# Patient Record
Sex: FEMALE | Race: WHITE | NOT HISPANIC OR LATINO
[De-identification: names, ages, dates, MRNs, and addresses within clinical notes are randomized per-mention and may not be internally consistent; named-entity substitution may affect disease eponyms.]

---

## 2017-08-21 ENCOUNTER — FORM ENCOUNTER (OUTPATIENT)
Age: 57
End: 2017-08-21

## 2018-07-30 ENCOUNTER — FORM ENCOUNTER (OUTPATIENT)
Age: 58
End: 2018-07-30

## 2018-09-04 ENCOUNTER — FORM ENCOUNTER (OUTPATIENT)
Age: 58
End: 2018-09-04

## 2019-12-16 ENCOUNTER — FORM ENCOUNTER (OUTPATIENT)
Age: 59
End: 2019-12-16

## 2019-12-26 ENCOUNTER — FORM ENCOUNTER (OUTPATIENT)
Age: 59
End: 2019-12-26

## 2020-01-12 ENCOUNTER — FORM ENCOUNTER (OUTPATIENT)
Age: 60
End: 2020-01-12

## 2020-01-21 ENCOUNTER — FORM ENCOUNTER (OUTPATIENT)
Age: 60
End: 2020-01-21

## 2020-06-22 ENCOUNTER — FORM ENCOUNTER (OUTPATIENT)
Age: 60
End: 2020-06-22

## 2020-12-21 ENCOUNTER — FORM ENCOUNTER (OUTPATIENT)
Age: 60
End: 2020-12-21

## 2021-01-19 ENCOUNTER — APPOINTMENT (OUTPATIENT)
Dept: BREAST CENTER | Facility: CLINIC | Age: 61
End: 2021-01-19
Payer: COMMERCIAL

## 2021-01-19 VITALS
HEART RATE: 77 BPM | SYSTOLIC BLOOD PRESSURE: 150 MMHG | DIASTOLIC BLOOD PRESSURE: 87 MMHG | BODY MASS INDEX: 36.37 KG/M2 | HEIGHT: 68 IN | WEIGHT: 240 LBS

## 2021-01-19 DIAGNOSIS — Z87.891 PERSONAL HISTORY OF NICOTINE DEPENDENCE: ICD-10-CM

## 2021-01-19 DIAGNOSIS — Z78.9 OTHER SPECIFIED HEALTH STATUS: ICD-10-CM

## 2021-01-19 PROCEDURE — 99204 OFFICE O/P NEW MOD 45 MIN: CPT

## 2021-01-19 PROCEDURE — 99072 ADDL SUPL MATRL&STAF TM PHE: CPT

## 2021-01-19 RX ORDER — SITAGLIPTIN 100 MG/1
100 TABLET, FILM COATED ORAL
Qty: 90 | Refills: 0 | Status: ACTIVE | COMMUNITY
Start: 2019-11-03

## 2021-01-19 RX ORDER — COLCHICINE 0.6 MG/1
0.6 TABLET, FILM COATED ORAL
Qty: 30 | Refills: 0 | Status: ACTIVE | COMMUNITY
Start: 2020-07-30

## 2021-01-19 RX ORDER — DIPHENHYDRAMINE HCL 25 MG/1
25 CAPSULE ORAL ONCE
Qty: 1 | Refills: 0 | Status: ACTIVE | COMMUNITY
Start: 2021-01-19 | End: 1900-01-01

## 2021-01-19 RX ORDER — DIAZEPAM 2 MG/1
2 TABLET ORAL
Qty: 1 | Refills: 0 | Status: ACTIVE | COMMUNITY
Start: 2021-01-19 | End: 1900-01-01

## 2021-01-19 RX ORDER — METRONIDAZOLE 7.5 MG/G
0.75 GEL VAGINAL
Qty: 70 | Refills: 0 | Status: ACTIVE | COMMUNITY
Start: 2020-12-28

## 2021-01-19 RX ORDER — INSULIN GLARGINE 100 [IU]/ML
100 INJECTION, SOLUTION SUBCUTANEOUS
Qty: 45 | Refills: 0 | Status: ACTIVE | COMMUNITY
Start: 2019-12-15

## 2021-01-19 RX ORDER — NYSTATIN AND TRIAMCINOLONE ACETONIDE 100000; 1 [USP'U]/G; MG/G
100000-0.1 OINTMENT TOPICAL
Qty: 30 | Refills: 0 | Status: ACTIVE | COMMUNITY
Start: 2020-12-23

## 2021-01-19 RX ORDER — FLUCONAZOLE 150 MG/1
150 TABLET ORAL
Qty: 1 | Refills: 0 | Status: ACTIVE | COMMUNITY
Start: 2020-12-29

## 2021-01-19 RX ORDER — PREDNISONE 10 MG/1
10 TABLET ORAL ONCE
Qty: 1 | Refills: 0 | Status: ACTIVE | COMMUNITY
Start: 2021-01-19 | End: 1900-01-01

## 2021-01-19 RX ORDER — FLASH GLUCOSE SENSOR
KIT MISCELLANEOUS
Qty: 2 | Refills: 0 | Status: ACTIVE | COMMUNITY
Start: 2020-07-25

## 2021-01-20 NOTE — ASSESSMENT
[FreeTextEntry1] : 61yo female previously seen by Dr. Winter w/ hx of L Lumpx & ALND w/ Dr. Juliano Norris 1997 for IDC and DCIS ER/[place] (+), HER2 (-), (0/8) LN, negative margins s/p chemo + RT presents for surgical consultation for newly diagnosed L DCIS. Physical examination reveals a smaller L breast with post-radiation changes and lower inner quadrant skin thickening. Patient had genetic testing through Dr. oBoth's office and is to have MRI Monday 1/25/21. Patient to return in about one or two weeks to go over MRI results and discuss surgical recommendations. Prelude test to possibly be send on core to determine the potential need for RT.\par \par

## 2021-01-20 NOTE — PAST MEDICAL HISTORY
[Menarche Age ____] : age at menarche was [unfilled] [Menopause Age____] : age at menopause was [unfilled] [Total Preg ___] : G[unfilled] [FreeTextEntry5] : JEANNETTE [FreeTextEntry7] : YES

## 2021-01-20 NOTE — PHYSICAL EXAM
[Examined in the supine and seated position] : examined in the supine and seated position [No dominant masses] : no dominant masses in right breast  [No Nipple Retraction] : no right nipple retraction [No Nipple Discharge] : no right nipple discharge [Breast Mass Right Breast ___cm] : no masses [Breast Mass Left Breast ___cm] : no masses [No Axillary Lymphadenopathy] : no left axillary lymphadenopathy [de-identified] : smaller than R breast with post-radiation and post-lumpx changes, lower inner quadrant skin thickening noted

## 2021-01-20 NOTE — DATA REVIEWED
[FreeTextEntry1] : 3/[birth year]: L Lumpx & ALND: 3.2cm IDC and DCIS, (0/8) LN, ER+ (81%), ID+ (43%), HER2 (-), negative margins\par 12/17/19 Nicolas Screening MG - Increased skin thickening in lower inner L breast. BIRADS 0. Recommend MRI. \par 12/27/19 Nicolas MRI - Nodular enhancement at L inner portion of thickened skin surface. Recommend biopsy. BIRADS 4. \par 1/13/20 L 9:00 US Core Bx - fibrocollagenous scar w/ chronic inflammation. No evidence of neoplasm. Surgery consult necessary for clinical correlation and to evaluate L axillary lymph node with mildly thickened cortex that's normal in size. \par 1/22/20: L Punch Biopsy Lower Inner Quadrant: follicular cyst, infundibular type\par 12/22/20: Nicolas MG & US: scattered areas of fibroglandular density, L - new regional coarse calcs some w/ linear configuration periareolar region UOQ 3cm deep to nipple, benign axillary LN w/ fatty hilum, vascular calcs, post-op changes and calcifying oil cyst/fat necrosis UOQ, persistent skin thickening inner lower aspect stable compared to 1/2020, round rim and dystrophic calcs, US - L - stable skin thickening 9:00 2FN, 1.3cm abnormal LN slightly increased in size. BIRADS 4. L Stereo bx rec. \par 1/14/21: L Stereo bx and L Axillary Bx: L Stereo bx Upper Outer Periareolar - "barbell" marker - DCIS ER+ (95%), ID+ (80%) w/ residual adjacent calcs; L Axillary LN - reactive LN w/ sinus histiocytosis no evidence of metastatic carcinoma - concordant.

## 2021-01-28 ENCOUNTER — APPOINTMENT (OUTPATIENT)
Dept: BREAST CENTER | Facility: CLINIC | Age: 61
End: 2021-01-28
Payer: COMMERCIAL

## 2021-01-28 PROCEDURE — 99213 OFFICE O/P EST LOW 20 MIN: CPT

## 2021-01-28 PROCEDURE — 99072 ADDL SUPL MATRL&STAF TM PHE: CPT

## 2021-02-03 ENCOUNTER — NON-APPOINTMENT (OUTPATIENT)
Age: 61
End: 2021-02-03

## 2021-02-04 ENCOUNTER — NON-APPOINTMENT (OUTPATIENT)
Age: 61
End: 2021-02-04

## 2021-02-04 NOTE — PHYSICAL EXAM
[Examined in the supine and seated position] : examined in the supine and seated position [No dominant masses] : no dominant masses in right breast  [No Nipple Retraction] : no right nipple retraction [No Nipple Discharge] : no right nipple discharge [Breast Mass Right Breast ___cm] : no masses [Breast Mass Left Breast ___cm] : no masses [No Axillary Lymphadenopathy] : no left axillary lymphadenopathy [de-identified] : smaller than R breast with post-radiation and post-lumpx changes, lower inner quadrant skin thickening noted

## 2021-02-04 NOTE — ASSESSMENT
[FreeTextEntry1] : 61yo female previously seen by Dr. Winter w/ hx of L Lumpx & ALND w/ Dr. Juliano Norris 1997 for IDC and DCIS ER/[place] (+), HER2 (-), (0/8) LN, negative margins s/p chemo + RT presents for surgical consultation for newly diagnosed L DCIS. Physical examination reveals a smaller L breast with post-radiation changes and lower inner quadrant skin thickening. Patient had genetic testing through Dr. Booth's office which is still pending. MRI 1/25/21 shows no additional areas of concern. Prelude test still pending. Patient wants to move forward with surgical planning and prefers lumpectomy over mastectomy. \par \par

## 2021-02-04 NOTE — HISTORY OF PRESENT ILLNESS
[FreeTextEntry1] : 61yo female w/ newly diagnosed L DCIS w/ hx of L Lumpx & ALND w/ Dr. Juliano Norris 1997 for IDC and DCIS ER/[place] (+), HER2 (-), (0/8) LN, negative margins s/p chemo + RT. Patient had MRI 1/25/21 and no additional areas of concern were identified. Patient's genetic testing and Prelude test are still pending. Patient presents today to discuss moving forward with surgery and is interested in having a lumpectomy rather than a mastectomy. \par

## 2021-02-04 NOTE — DATA REVIEWED
[FreeTextEntry1] : [birth year]: L Lumpx & ALND: 3.2cm IDC and DCIS, (0/8) LN, ER+ (81%), WI+ (43%), HER2 (-), negative margins\par 12/17/19 Nicolas Screening MG - Increased skin thickening in lower inner L breast. BIRADS 0. Recommend MRI. \par 12/27/19 Nicolas MRI - Nodular enhancement at L inner portion of thickened skin surface. Recommend biopsy. BIRADS 4. \par 1/13/20 L 9:00 US Core Bx - fibrocollagenous scar w/ chronic inflammation. No evidence of neoplasm. Surgery consult necessary for clinical correlation and to evaluate L axillary lymph node with mildly thickened cortex that's normal in size. \par 1/22/20: L Punch Biopsy Lower Inner Quadrant: follicular cyst, infundibular type\par 12/22/20: Nicolas MG & US: scattered areas of fibroglandular density, L - new regional coarse calcs some w/ linear configuration periareolar region UOQ 3cm deep to nipple, benign axillary LN w/ fatty hilum, vascular calcs, post-op changes and calcifying oil cyst/fat necrosis UOQ, persistent skin thickening inner lower aspect stable compared to 1/2020, round rim and dystrophic calcs, US - L - stable skin thickening 9:00 2FN, 1.3cm abnormal LN slightly increased in size. BIRADS 4. L Stereo bx rec. \par 1/14/21: L Stereo bx and L Axillary Bx: L Stereo bx Upper Outer Periareolar - "barbell" marker - DCIS ER+ (95%), WI+ (80%) w/ residual adjacent calcs; L Axillary LN - reactive LN w/ sinus histiocytosis no evidence of metastatic carcinoma - concordant. \par \par 1/25/21: MRI: scattered fibroglandular tissue, minimal background parenchymal enhancement. L - periareolar region UOQ 1:00 and metallic marker is present w/ assoc. 0.7cm partial rim enhancement and adjacent associated hermatoma 3.2cm deep to nipple, 1.5cm from lateral skin surface at site of known DCIS, 0.8cm axillary LN assoc. w/ tissue marker w/ adjacent patchy enhancement c/w post-bx change, post-op changes UOQ, inner aspect of L breast, skin thickening and skin enhancement present., findings c/w known DCIS and assoc. bx change 1:00 periareolar region. BIRADS 6.

## 2021-02-17 ENCOUNTER — RESULT REVIEW (OUTPATIENT)
Age: 61
End: 2021-02-17

## 2021-02-17 ENCOUNTER — OUTPATIENT (OUTPATIENT)
Dept: OUTPATIENT SERVICES | Facility: HOSPITAL | Age: 61
LOS: 1 days | End: 2021-02-17
Payer: COMMERCIAL

## 2021-02-17 DIAGNOSIS — D05.12 INTRADUCTAL CARCINOMA IN SITU OF LEFT BREAST: ICD-10-CM

## 2021-02-17 PROCEDURE — 88321 CONSLTJ&REPRT SLD PREP ELSWR: CPT

## 2021-02-18 LAB — SURGICAL PATHOLOGY STUDY: SIGNIFICANT CHANGE UP

## 2021-02-23 ENCOUNTER — LABORATORY RESULT (OUTPATIENT)
Age: 61
End: 2021-02-23

## 2021-02-23 ENCOUNTER — OUTPATIENT (OUTPATIENT)
Dept: OUTPATIENT SERVICES | Facility: HOSPITAL | Age: 61
LOS: 1 days | End: 2021-02-23
Payer: COMMERCIAL

## 2021-02-23 ENCOUNTER — RESULT REVIEW (OUTPATIENT)
Age: 61
End: 2021-02-23

## 2021-02-23 ENCOUNTER — APPOINTMENT (OUTPATIENT)
Dept: MAMMOGRAPHY | Facility: HOSPITAL | Age: 61
End: 2021-02-23
Payer: COMMERCIAL

## 2021-02-23 PROCEDURE — 19281 PERQ DEVICE BREAST 1ST IMAG: CPT | Mod: LT

## 2021-02-23 PROCEDURE — C1739: CPT

## 2021-02-23 PROCEDURE — 19281 PERQ DEVICE BREAST 1ST IMAG: CPT

## 2021-02-25 ENCOUNTER — TRANSCRIPTION ENCOUNTER (OUTPATIENT)
Age: 61
End: 2021-02-25

## 2021-02-26 ENCOUNTER — OUTPATIENT (OUTPATIENT)
Dept: OUTPATIENT SERVICES | Facility: HOSPITAL | Age: 61
LOS: 1 days | Discharge: HOME | End: 2021-02-26
Payer: COMMERCIAL

## 2021-02-26 ENCOUNTER — APPOINTMENT (OUTPATIENT)
Dept: BREAST CENTER | Facility: CLINIC | Age: 61
End: 2021-02-26

## 2021-02-26 ENCOUNTER — RESULT REVIEW (OUTPATIENT)
Age: 61
End: 2021-02-26

## 2021-02-26 LAB — GLUCOSE BLDC GLUCOMTR-MCNC: 217 MG/DL — HIGH (ref 70–99)

## 2021-02-26 PROCEDURE — 88360 TUMOR IMMUNOHISTOCHEM/MANUAL: CPT | Mod: 26

## 2021-02-26 PROCEDURE — 88307 TISSUE EXAM BY PATHOLOGIST: CPT | Mod: 26

## 2021-02-26 PROCEDURE — 76098 X-RAY EXAM SURGICAL SPECIMEN: CPT | Mod: 26

## 2021-02-26 PROCEDURE — 88342 IMHCHEM/IMCYTCHM 1ST ANTB: CPT | Mod: 26,59

## 2021-02-26 PROCEDURE — 88305 TISSUE EXAM BY PATHOLOGIST: CPT | Mod: 26

## 2021-02-26 PROCEDURE — 19301 PARTIAL MASTECTOMY: CPT | Mod: LT

## 2021-02-26 PROCEDURE — 14301 TIS TRNFR ANY 30.1-60 SQ CM: CPT

## 2021-02-26 PROCEDURE — 88341 IMHCHEM/IMCYTCHM EA ADD ANTB: CPT | Mod: 26,59

## 2021-03-03 LAB — SURGICAL PATHOLOGY STUDY: SIGNIFICANT CHANGE UP

## 2021-03-08 ENCOUNTER — NON-APPOINTMENT (OUTPATIENT)
Age: 61
End: 2021-03-08

## 2021-03-11 ENCOUNTER — APPOINTMENT (OUTPATIENT)
Dept: BREAST CENTER | Facility: CLINIC | Age: 61
End: 2021-03-11
Payer: COMMERCIAL

## 2021-03-11 VITALS
HEIGHT: 68 IN | SYSTOLIC BLOOD PRESSURE: 145 MMHG | WEIGHT: 240 LBS | BODY MASS INDEX: 36.37 KG/M2 | DIASTOLIC BLOOD PRESSURE: 86 MMHG | HEART RATE: 81 BPM

## 2021-03-11 PROCEDURE — 99024 POSTOP FOLLOW-UP VISIT: CPT

## 2021-03-11 NOTE — CONSULT LETTER
[Dear  ___] : Dear  [unfilled], [Referral Letter:] : I am referring [unfilled] to you for further evaluation.  My most recent evaluation follows. [Referral Closing:] : Thank you very much for seeing this patient.  If you have any questions, please do not hesitate to contact me. [Sincerely,] : Sincerely,

## 2021-03-17 ENCOUNTER — NON-APPOINTMENT (OUTPATIENT)
Age: 61
End: 2021-03-17

## 2021-03-23 NOTE — HISTORY OF PRESENT ILLNESS
[FreeTextEntry1] : 59yo female presents for post-operative assessment s/p L Lumpx 2/26/21 which yielded 0.7cm DCIS, intermediate nuclear grade, solid and cribriform types, assoc. w/ calcs with one minute 0.15cm focus of DCIS present in additional lateral margin and 0.5cm away from final lateral margin. \par \par Patient has hx of L Lumpx & ALND w/ Dr. Juliano Norris 1997 for IDC and DCIS ER/[place] (+), HER2 (-), (0/8) LN, negative margins s/p chemo + RT. Patient had MRI 1/25/21 and no additional areas of concern were identified. Patient's genetic testing was negative and Prelude test revealed no additional benefit from radiation therapy.

## 2021-03-23 NOTE — DATA REVIEWED
[FreeTextEntry1] : [birth year]: L Lumpx & ALND: 3.2cm IDC and DCIS, (0/8) LN, ER+ (81%), [place]+ (43%), HER2 (-), negative margins\par 12/17/19 Nicolas Screening MG - Increased skin thickening in lower inner L breast. BIRADS 0. Recommend MRI. \par 12/27/19 Nicolas MRI - Nodular enhancement at L inner portion of thickened skin surface. Recommend biopsy. BIRADS 4. \par 1/13/20 L 9:00 US Core Bx - fibrocollagenous scar w/ chronic inflammation. No evidence of neoplasm. Surgery consult necessary for clinical correlation and to evaluate L axillary lymph node with mildly thickened cortex that's normal in size. \par 1/22/20: L Punch Biopsy Lower Inner Quadrant: follicular cyst, infundibular type\par 12/22/20: Nicolas MG & US: scattered areas of fibroglandular density, L - new regional coarse calcs some w/ linear configuration periareolar region UOQ 3cm deep to nipple, benign axillary LN w/ fatty hilum, vascular calcs, post-op changes and calcifying oil cyst/fat necrosis UOQ, persistent skin thickening inner lower aspect stable compared to 1/2020, round rim and dystrophic calcs, US - L - stable skin thickening 9:00 2FN, 1.3cm abnormal LN slightly increased in size. BIRADS 4. L Stereo bx rec. \par 1/14/21: L Stereo bx and L Axillary Bx: L Stereo bx Upper Outer Periareolar - "barbell" marker - DCIS ER+ (95%), [place]+ (80%) w/ residual adjacent calcs; L Axillary LN - reactive LN w/ sinus histiocytosis no evidence of metastatic carcinoma - concordant. \par \par 1/25/21: MRI: scattered fibroglandular tissue, minimal background parenchymal enhancement. L - periareolar region UOQ 1:00 and metallic marker is present w/ assoc. 0.7cm partial rim enhancement and adjacent associated hermatoma 3.2cm deep to nipple, 1.5cm from lateral skin surface at site of known DCIS, 0.8cm axillary LN assoc. w/ tissue marker w/ adjacent patchy enhancement c/w post-bx change, post-op changes UOQ, inner aspect of L breast, skin thickening and skin enhancement present., findings c/w known DCIS and assoc. bx change 1:00 periareolar region. BIRADS 6.\par \par 2/26/21: L Lumpx: DCIS, intermeidate nuclear grade, solid and cribriform types, assoc. w/ calcs with one minute 0.15cm focus of DCIS in additional lateral margin and 0.5cm away from final lateral margin

## 2021-03-23 NOTE — ASSESSMENT
[FreeTextEntry1] : 59yo female presents for post-operative assessment s/p L Lumpx 2/26/21 which yielded 0.7cm DCIS, intermediate nuclear grade, solid and cribriform types, assoc. w/ calcs with one minute 0.15cm focus of DCIS present in additional lateral margin and 0.5cm away from final lateral margin. \par \par Patient has hx of L Lumpx & ALND w/ Dr. Juliano Norris 1997 for IDC and DCIS ER/[place] (+), HER2 (-), (0/8) LN, negative margins s/p chemo + RT. Patient had MRI 1/25/21 and no additional areas of concern were identified. Patient's genetic testing was negative and Prelude test revealed no additional benefit from radiation therapy. \par \par Physical examination reveals well-healing L Lumpectomy incision with no erythema, dehiscence, or drainage noted. Old steri strips removed and new steris were applied. Patient is to have Nicolas DXMG & US and re-examination in 9 months. She is to have radiation oncology consultation with Dr. Patel and medical oncology consultation with Dr. Kayla Erwin.

## 2021-03-23 NOTE — PHYSICAL EXAM
[Examined in the supine and seated position] : examined in the supine and seated position [No dominant masses] : no dominant masses in right breast  [No Nipple Retraction] : no right nipple retraction [No Nipple Discharge] : no right nipple discharge [Breast Mass Right Breast ___cm] : no masses [Breast Mass Left Breast ___cm] : no masses [No Axillary Lymphadenopathy] : no left axillary lymphadenopathy [de-identified] : smaller than R breast with post-radiation and post-lumpx changes, lower inner quadrant skin thickening noted\par \par well-healing lumpectomy site with no erythema, dehiscence, or drainage

## 2021-04-08 ENCOUNTER — APPOINTMENT (OUTPATIENT)
Dept: RADIATION ONCOLOGY | Facility: CLINIC | Age: 61
End: 2021-04-08
Payer: COMMERCIAL

## 2021-04-08 VITALS
RESPIRATION RATE: 17 BRPM | DIASTOLIC BLOOD PRESSURE: 89 MMHG | BODY MASS INDEX: 36.34 KG/M2 | TEMPERATURE: 98.3 F | SYSTOLIC BLOOD PRESSURE: 144 MMHG | OXYGEN SATURATION: 99 % | WEIGHT: 239 LBS | HEART RATE: 77 BPM

## 2021-04-08 DIAGNOSIS — E03.9 HYPOTHYROIDISM, UNSPECIFIED: ICD-10-CM

## 2021-04-08 PROCEDURE — 99072 ADDL SUPL MATRL&STAF TM PHE: CPT

## 2021-04-08 PROCEDURE — 99204 OFFICE O/P NEW MOD 45 MIN: CPT | Mod: 25

## 2021-04-08 RX ORDER — GLIPIZIDE 10 MG/1
10 TABLET ORAL
Refills: 0 | Status: ACTIVE | COMMUNITY

## 2021-04-08 RX ORDER — LEVOTHYROXINE SODIUM 137 UG/1
TABLET ORAL
Refills: 0 | Status: ACTIVE | COMMUNITY

## 2021-04-08 RX ORDER — SULFAMETHOXAZOLE AND TRIMETHOPRIM 800; 160 MG/1; MG/1
800-160 TABLET ORAL
Qty: 6 | Refills: 0 | Status: COMPLETED | COMMUNITY
Start: 2020-12-29 | End: 2021-04-08

## 2021-04-08 RX ORDER — ATORVASTATIN CALCIUM 80 MG/1
TABLET, FILM COATED ORAL
Refills: 0 | Status: ACTIVE | COMMUNITY

## 2021-04-08 RX ORDER — OXYCODONE AND ACETAMINOPHEN 5; 325 MG/1; MG/1
5-325 TABLET ORAL
Qty: 10 | Refills: 0 | Status: DISCONTINUED | COMMUNITY
Start: 2021-02-26 | End: 2021-04-08

## 2021-04-08 RX ORDER — METFORMIN HYDROCHLORIDE 625 MG/1
TABLET ORAL
Refills: 0 | Status: ACTIVE | COMMUNITY

## 2021-04-08 RX ORDER — METHYLPREDNISOLONE 32 MG/1
32 TABLET ORAL TWICE DAILY
Qty: 2 | Refills: 0 | Status: DISCONTINUED | COMMUNITY
Start: 2021-01-22 | End: 2021-04-08

## 2021-04-08 RX ORDER — RAMIPRIL 5 MG/1
CAPSULE ORAL
Refills: 0 | Status: ACTIVE | COMMUNITY

## 2021-04-08 NOTE — PHYSICAL EXAM
[Obese] : obese [] : no respiratory distress [Respiration, Rhythm And Depth] : normal respiratory rhythm and effort [Auscultation Breath Sounds / Voice Sounds] : lungs were clear to auscultation bilaterally [Heart Rate And Rhythm] : heart rate and rhythm were normal [Heart Sounds] : normal S1 and S2 [Normal] : oriented to person, place and time, the affect was normal, the mood was normal and not anxious [de-identified] : declined

## 2021-04-08 NOTE — HISTORY OF PRESENT ILLNESS
[FreeTextEntry1] : Ms. Sutton is a 60 year old female referred by Dr. Smith for consideration of radiation therapy for an intermediate nuclear grade DCIS of the LEFT breast, ER positive, NJ positive, [id number]. AJCC stage 0.\par \par Patient has history of left breast IDC with DCIS, ER/NJ positive, HER-2 negative 0/8 lymph nodes, s/p left lumpectomy and ALND in 1997. followed by chemotherapy and adjuvant whole breast radiation therapy.\par \par Imaging as follows as per Dr. Smith's note:\par 12/17/19- Bilat screening mammo- Increased skin thickening in lower inner LEFT breast. BIRADS 0. Recommend MRI. \par 12/27/19- Breast MRI- Nodular enhancement at LEFT inner portion of thickened skin surface. Recommend biopsy. BI-RADS 4. \par 1/13/2020- LEFT 9:00 US Core Bx- fibrocollagenous scar w/ chronic inflammation. No evidence of neoplasm. Surgery consult necessary for clinical correlation and to evaluate LEFT axillary lymph node with mildly thickened cortex that's normal in size. \par 1/22/2020- LEFT Punch Biopsy Lower Inner Quadrant: follicular cyst, infundibular type\par 12/22/2020- Bilat mammo/sono- scattered areas of fibroglandular density, LEFT- new regional coarse calcs some w/ linear configuration periareolar region UOQ 3cm deep to nipple, benign axillary LN w/ fatty hilum, vascular calcs, post-op changes and calcifying oil cyst/fat necrosis UOQ, persistent skin thickening inner lower aspect stable compared to 1/2020, round rim and dystrophic calcs, US - L - stable skin thickening 9:00 2FN, 1.3cm abnormal LN slightly increased in size. BIRADS 4. L Stereo bx rec. \par \par Stereotactic biopsy of the mass in the left breast upper outer quadrant done 1/11/21 showed the following pathology:\par -Ductal carcinoma in situ (DCIS), cribriform type with intermediate to high nuclear grade with marked necrosis.  Calcifications associated with DCIS. ER positive, (95%, strong) and NJ positive (80%, strong).\par \par US guided core biopsy of left axillary lymph node done 1/11/21 showed no evidence of metastatic neoplasm. \par \par Breast MRI done 1/25/21 showed the following:\par -In the anterior LEFT breast in the periareolar region at 1:00, there are findings consistent with the known DCIS and associated postbiopsy change. \par -Skin thickening and skin enhancement at the inner aspect of the left breast is stable. \par -No suspicious abnormality on the right. \par BI-RADS 6\par \par Lumpectomy done 2/26/21 showed the following pathology: \par 1. Left breast mass, resection: Ductal carcinoma in situ (DCIS), intermediate nuclear grade, solid and cribriform types, associated with calcifications. \par 2. Left breast medial margin, resection: Benign.\par 3. Left breast inferior margin, resection: Benign. \par 4. Left breast lateral margin, resection: One minute focus of ductal carcinoma in situ (DCIS) involving one single duct (1.5 mm), 5 mm from the final lateral margin. \par 5. Left breast superior margin, resection: Benign. \par 6. Left breast deep margin, resection: Benign. \par 7. Left breast anterior margin, resection: Benign. \par 8. Left breast superior-medial, resection: Benign. \par Pathologic Staging (pTMN, AJCC 8th Edition): pTis pNx\par \par As per Dr. Smith's note, genetic testing was negative and DCISionRT revealed no additional benefit from RT (score was 0.8). She followed up with Dr. Smith on 3/11/21, at which time she was referred here for discussion of RT. She was also referred to Medical Oncologist Dr. Erwin, who she is scheduled to see on 4/12/21. She is to follow up with Dr. Smith in December. \par \par Today, she notes she feels fairly well. Notes slight tenderness to left breast. Denies any further complaints to include fever, chills, fatigue, CP, SOB. She notes RT to left breast in the past resulted in breast shrinkage and skin thickening. \par \par Family history notable for the following:\par Mother- breast cancer diagnosed in her 70's and lung cancer diagnosed in her 80's\par Maternal aunt- breast cancer diagnosed in her 70's and lung cancer diagnosed in her 90's\par Maternal cousins- three maternal first cousins with breast cancer \par \par Patient is a former smoker. She lives in [facility]) with her . She gets to medical appointments via car.

## 2021-04-08 NOTE — REVIEW OF SYSTEMS
[Negative] : Allergic/Immunologic [de-identified] : s/p RT to left breast, s/p lumpectomy left breast

## 2021-04-08 NOTE — VITALS
[Maximal Pain Intensity: 0/10] : 0/10 [Least Pain Intensity: 0/10] : 0/10 [90: Able to carry normal activity; minor signs or symptoms of disease.] : 90: Able to carry normal activity; minor signs or symptoms of disease.  [Date: ____________] : Patient's last distress assessment performed on [unfilled]. [1 - Distress Level] : Distress Level: 1

## 2021-04-12 ENCOUNTER — RESULT REVIEW (OUTPATIENT)
Age: 61
End: 2021-04-12

## 2021-04-12 ENCOUNTER — APPOINTMENT (OUTPATIENT)
Dept: HEMATOLOGY ONCOLOGY | Facility: CLINIC | Age: 61
End: 2021-04-12
Payer: COMMERCIAL

## 2021-04-12 VITALS
OXYGEN SATURATION: 99 % | BODY MASS INDEX: 37.51 KG/M2 | DIASTOLIC BLOOD PRESSURE: 83 MMHG | SYSTOLIC BLOOD PRESSURE: 147 MMHG | TEMPERATURE: 98.6 F | WEIGHT: 247.5 LBS | RESPIRATION RATE: 16 BRPM | HEART RATE: 70 BPM | HEIGHT: 67.99 IN

## 2021-04-12 DIAGNOSIS — E11.9 TYPE 2 DIABETES MELLITUS W/OUT COMPLICATIONS: ICD-10-CM

## 2021-04-12 DIAGNOSIS — E66.9 OBESITY, UNSPECIFIED: ICD-10-CM

## 2021-04-12 DIAGNOSIS — I10 ESSENTIAL (PRIMARY) HYPERTENSION: ICD-10-CM

## 2021-04-12 PROCEDURE — 99072 ADDL SUPL MATRL&STAF TM PHE: CPT

## 2021-04-12 PROCEDURE — 99245 OFF/OP CONSLTJ NEW/EST HI 55: CPT

## 2021-04-13 PROBLEM — I10 HYPERTENSION, UNSPECIFIED TYPE: Status: ACTIVE | Noted: 2021-04-08

## 2021-04-13 PROBLEM — E66.9 OBESITY, CLASS II, BMI 35-39.9: Status: ACTIVE | Noted: 2021-04-13

## 2021-04-13 PROBLEM — E11.9 DIABETES MELLITUS: Status: ACTIVE | Noted: 2021-01-19

## 2021-04-13 NOTE — ASSESSMENT
[FreeTextEntry1] : 59 yo  female, G0,  with second occurrence of right breast malignancy.\par \par March 1997 : L Lumpx & ALND: 3.2cm IDC and DCIS, (0/8) LN, ER+ (81%), WA+ (43%), HER2 (-), negative margins, s/p CMF, radiation, No endocrine treatment offered to patient.\par \par Feb 2021- left breast lumpectomy- DCIS with negative margin, ER pos, WA pos. \par OncotypeRT- low risk - RT deferred\par \par Reviewed with patient results of pathology report, therapeutic and prognostic implications of receptor status and indication for systemic adjuvant risk reduction treatment. We went over options for treatment with SERM and AI - explained side effects, monitoring parameters and benefit.\par \par Patient offered exemestane, she will follow up with dexa prior and start treatment \par \par Patient wants to think about the treatment and she will call after dexa.\par \par BMI 37- reviewed, influence of diet, physical activity, BMI and alcohol on risk of recurrence. \par \par Genetics negative - 2021

## 2021-04-13 NOTE — HISTORY OF PRESENT ILLNESS
[Disease: _____________________] : Disease: [unfilled] [T: ___] : T[unfilled] [de-identified] : 1997 IDC h/o CMF chemo, no LN involvement, no endocrine treatment ? receptor status   [de-identified] : 60 year old female referred by Dr. Smith for DCIS.  19 Nicolas Screening MG - Increased skin thickening in lower inner L breast. BIRADS 0. Recommend MRI. \par 19 Nicolas MRI - Nodular enhancement at L inner portion of thickened skin surface. Recommend biopsy. BIRADS 4. \par 20 L 9:00 US Core Bx - fibrocollagenous scar w/ chronic inflammation. No evidence of neoplasm. Surgery consult necessary for clinical correlation and to evaluate L axillary lymph node with mildly thickened cortex that's normal in size. \par 20: L Punch Biopsy Lower Inner Quadrant: follicular cyst, infundibular type\par 20: Nicolas MG & US: scattered areas of fibroglandular density, L - new regional coarse calcs some w/ linear configuration periareolar region UOQ 3cm deep to nipple, benign axillary LN w/ fatty hilum, vascular calcs, post-op changes and calcifying oil cyst/fat necrosis UOQ, persistent skin thickening inner lower aspect stable compared to 2020, round rim and dystrophic calcs, US - L - stable skin thickening 9:00 2FN, 1.3cm abnormal LN slightly increased in size. BIRADS 4. L Stereo bx rec. \par 21: L Stereo bx and L Axillary Bx: L Stereo bx Upper Outer Periareolar - "barbell" marker - DCIS ER+ (95%), [place]+ (80%) w/ residual adjacent calcs; L Axillary LN - reactive LN w/ sinus histiocytosis no evidence of metastatic carcinoma - concordant. \par \par 21: MRI: scattered fibroglandular tissue, minimal background parenchymal enhancement. L - periareolar region UOQ 1:00 and metallic marker is present w/ assoc. 0.7cm partial rim enhancement and adjacent associated hematoma 3.2cm deep to nipple, 1.5cm from lateral skin surface at site of known DCIS, 0.8cm axillary LN assoc. w/ tissue marker w/ adjacent patchy enhancement c/w post-bx change, post-op changes UOQ, inner aspect of L breast, skin thickening and skin enhancement present., findings c/w known DCIS and assoc. bx change 1:00 periareolar region. BIRADS 6.\par \par Pathology from 2021\par 1. Left breast mass, resection:\par - Ductal carcinoma in situ (DCIS), intermediate nuclear\par grade, solid and cribriform\par types, associated with calcifications\par - Previous biopsy site-related change\par - See synoptic report below\par \par 2. Left breast medial margin, resection:\par - Benign breast tissue, negative for tumor\par \par 3. Left breast inferior margin, resection:\par - Benign breast tissue, negative for tumor\par \par 4. Left breast lateral margin, resection:\par - One minute focus of ductal carcinoma in situ (DCIS)\par involving one single duct (1.5\par mm), 5 mm from the final lateral margin (Slide 4A)\par - See comment\par \par Comment: The results of immunohistochemical stains for CK 5/6 and\par ER support the above diagnosis. Slide 4A was also reviewed by DrGeetha\par Jessica Henderson at the Department of Pathology\par \par 5. Left breast superior margin, resection:\par - Benign breast tissue, negative for tumor\par \par 6. Left breast deep margin, resection:\par - Benign breast tissue, negative for tumor\par \par 7. Left breast anterior margin, resection:\par - Benign breast tissue, negative for tumor\par \par 8. Left breast superior-medial, resection:\par - Benign breast tissue, negative for tumor\par \par \par Risk Factors:\par Age at menarche- 11\par Age at menopause- 51\par , 0 Para 0\par OCP-x 1 year\par HRT- denies\par Family History- mother with breast cancer in her 70's, maternal aunt 70's, 3 first cousins with breast cancer.  Personal hx of breast cancer in  invasive breast cancer with 12 LN's removed- chemo and RT. \par Genetics- negative \par Smoker- former smoker, quit 17 years ago, smoked 33 years 2PPD

## 2021-04-13 NOTE — PHYSICAL EXAM
[Fully active, able to carry on all pre-disease performance without restriction] : Status 0 - Fully active, able to carry on all pre-disease performance without restriction [Obese] : obese [Normal] : affect appropriate [de-identified] : left breast healing after surgery, thickened skin and significant tenderness

## 2021-04-28 ENCOUNTER — RESULT REVIEW (OUTPATIENT)
Age: 61
End: 2021-04-28

## 2021-07-12 ENCOUNTER — APPOINTMENT (OUTPATIENT)
Dept: HEMATOLOGY ONCOLOGY | Facility: CLINIC | Age: 61
End: 2021-07-12

## 2021-07-13 ENCOUNTER — NON-APPOINTMENT (OUTPATIENT)
Age: 61
End: 2021-07-13

## 2021-07-21 ENCOUNTER — APPOINTMENT (OUTPATIENT)
Dept: BREAST CENTER | Facility: CLINIC | Age: 61
End: 2021-07-21
Payer: COMMERCIAL

## 2021-07-21 VITALS — BODY MASS INDEX: 37.44 KG/M2 | HEIGHT: 68 IN | WEIGHT: 247 LBS | HEART RATE: 77 BPM | OXYGEN SATURATION: 98 %

## 2021-07-21 DIAGNOSIS — Z80.3 FAMILY HISTORY OF MALIGNANT NEOPLASM OF BREAST: ICD-10-CM

## 2021-07-21 DIAGNOSIS — Z92.3 PERSONAL HISTORY OF IRRADIATION: ICD-10-CM

## 2021-07-21 DIAGNOSIS — Z12.39 ENCOUNTER FOR OTHER SCREENING FOR MALIGNANT NEOPLASM OF BREAST: ICD-10-CM

## 2021-07-21 DIAGNOSIS — Z92.21 PERSONAL HISTORY OF ANTINEOPLASTIC CHEMOTHERAPY: ICD-10-CM

## 2021-07-21 DIAGNOSIS — C50.912 MALIGNANT NEOPLASM OF UNSPECIFIED SITE OF LEFT FEMALE BREAST: ICD-10-CM

## 2021-07-21 PROCEDURE — 99213 OFFICE O/P EST LOW 20 MIN: CPT

## 2021-07-25 NOTE — ASSESSMENT
[FreeTextEntry1] : 61yo female LOV 3/11/21 presents for follow up  s/p L Lumpx 2/26/21 which yielded 0.7cm DCIS, intermediate nuclear grade, solid and cribriform types, assoc. w/ calcs with one minute 0.15cm focus of DCIS present in additional lateral margin and 0.5cm away from final lateral margin. \par \par Patient had prior hx of L Lumpx & ALND w/ Dr. Juliano Norris 1997 for IDC and DCIS ER/[place] (+), HER2 (-), (0/8) LN, negative margins s/p chemo + RT.\par \par DCISion RT was ordered with low risk score. Patient met with Dr. Patel and decided to abstain from radiation therapy at this time.\par \par Patient is resistant about taking antihormonal therapy and met with Dr. Erwin for discussion in April and met with an additional medical oncologist and, again did not follow up given her concern about side effects. Second medical oncologist discussed alternate dosages with shorter treatment duration. Discussed side effects with patient and reviewed the pros and cons of tamoxifen. Informed patient that the medication decreases risk of subsequent cancer by 50%. Recommended patient at least try the medication and see if she can tolerate it. Informed her that the side effects are patient dependent and some patients do well on it. \par \par Patient to have mammo/sono and reexamination in December 2021.

## 2021-07-25 NOTE — DATA REVIEWED
[FreeTextEntry1] : [birth year]: L Lumpx & ALND: 3.2cm IDC and DCIS, (0/8) LN, ER+ (81%), RI+ (43%), HER2 (-), negative margins\par 12/17/19 [facility] Screening MG - Increased skin thickening in lower inner L breast. BIRADS 0. Recommend MRI. \par 12/27/19 [facility] MRI - Nodular enhancement at L inner portion of thickened skin surface. Recommend biopsy. BIRADS 4. \par 1/13/20 L 9:00 US Core Bx - fibrocollagenous scar w/ chronic inflammation. No evidence of neoplasm. Surgery consult necessary for clinical correlation and to evaluate L axillary lymph node with mildly thickened cortex that's normal in size. \par 1/22/20: L Punch Biopsy Lower Inner Quadrant: follicular cyst, infundibular type\par 12/22/20: Nicolas MG & US: scattered areas of fibroglandular density, L - new regional coarse calcs some w/ linear configuration periareolar region UOQ 3cm deep to nipple, benign axillary LN w/ fatty hilum, vascular calcs, post-op changes and calcifying oil cyst/fat necrosis UOQ, persistent skin thickening inner lower aspect stable compared to 1/2020, round rim and dystrophic calcs, US - L - stable skin thickening 9:00 2FN, 1.3cm abnormal LN slightly increased in size. BIRADS 4. L Stereo bx rec. \par 1/14/21: L Stereo bx and L Axillary Bx: L Stereo bx Upper Outer Periareolar - "barbell" marker - DCIS ER+ (95%), RI+ (80%) w/ residual adjacent calcs; L Axillary LN - reactive LN w/ sinus histiocytosis no evidence of metastatic carcinoma - concordant. \par \par 1/25/21: MRI: scattered fibroglandular tissue, minimal background parenchymal enhancement. L - periareolar region UOQ 1:00 and metallic marker is present w/ assoc. 0.7cm partial rim enhancement and adjacent associated hermatoma 3.2cm deep to nipple, 1.5cm from lateral skin surface at site of known DCIS, 0.8cm axillary LN assoc. w/ tissue marker w/ adjacent patchy enhancement c/w post-bx change, post-op changes UOQ, inner aspect of L breast, skin thickening and skin enhancement present., findings c/w known DCIS and assoc. bx change 1:00 periareolar region. BIRADS 6.\par \par 2/26/21: L Lumpx: DCIS, intermeidate nuclear grade, solid and cribriform types, assoc. w/ calcs with one minute 0.15cm focus of DCIS in additional lateral margin and 0.5cm away from final lateral margin

## 2021-07-25 NOTE — PHYSICAL EXAM
[Examined in the supine and seated position] : examined in the supine and seated position [No dominant masses] : no dominant masses in right breast  [No Nipple Retraction] : no right nipple retraction [No Nipple Discharge] : no right nipple discharge [Breast Mass Right Breast ___cm] : no masses [Breast Mass Left Breast ___cm] : no masses [No Axillary Lymphadenopathy] : no left axillary lymphadenopathy [de-identified] : smaller than R breast with post-radiation and post-lumpx changes, lower inner quadrant skin thickening noted\par \par well-healing lumpectomy site with no erythema, dehiscence, or drainage

## 2021-07-25 NOTE — HISTORY OF PRESENT ILLNESS
[FreeTextEntry1] : 61yo female LOV 3/11/21 presents for follow up  s/p L Lumpx 2/26/21 which yielded 0.7cm DCIS, intermediate nuclear grade, solid and cribriform types, assoc. w/ calcs with one minute 0.15cm focus of DCIS present in additional lateral margin and 0.5cm away from final lateral margin. \par \par Patient has hx of L Lumpx & ALND w/ Dr. Juliano Norris 1997 for IDC and DCIS ER/DC (+), HER2 (-), (0/8) LN, negative margins s/p chemo + RT. Patient had MRI 1/25/21 and no additional areas of concern were identified. Patient's genetic testing was negative and Prelude test revealed no additional benefit from radiation therapy. \par \par Patient met with Dr. Patel and after careful consideration decided to abstain from radiation therapy. \par \par Patient is resistant to starting Tamoxifen as she is concerned about side effects

## 2021-12-30 ENCOUNTER — NON-APPOINTMENT (OUTPATIENT)
Age: 61
End: 2021-12-30

## 2022-03-01 ENCOUNTER — NON-APPOINTMENT (OUTPATIENT)
Age: 62
End: 2022-03-01

## 2022-04-13 ENCOUNTER — APPOINTMENT (OUTPATIENT)
Dept: BREAST CENTER | Facility: CLINIC | Age: 62
End: 2022-04-13
Payer: COMMERCIAL

## 2022-04-13 VITALS — HEIGHT: 68 IN | BODY MASS INDEX: 37.44 KG/M2 | WEIGHT: 247 LBS | HEART RATE: 81 BPM | OXYGEN SATURATION: 98 %

## 2022-04-13 DIAGNOSIS — R92.2 INCONCLUSIVE MAMMOGRAM: ICD-10-CM

## 2022-04-13 DIAGNOSIS — Z91.89 OTHER SPECIFIED PERSONAL RISK FACTORS, NOT ELSEWHERE CLASSIFIED: ICD-10-CM

## 2022-04-13 PROCEDURE — 99213 OFFICE O/P EST LOW 20 MIN: CPT

## 2022-04-13 RX ORDER — TAMOXIFEN CITRATE 20 MG/1
TABLET, FILM COATED ORAL
Refills: 0 | Status: ACTIVE | COMMUNITY

## 2022-04-17 NOTE — HISTORY OF PRESENT ILLNESS
[FreeTextEntry1] : 60 yo F presents for follow up with history of 0.7cm DCIS, intermediate nuclear grade, solid and cribriform types, assoc. w/ calcs with one minute 0.15cm focus of DCIS present in additional lateral margin and 0.5cm away from final lateral margin s/p L lumpectomy on 2/26/21.\par \par Patient has hx of L Lumpx & ALND w/ Dr. Juliano Norris 1997 for IDC and DCIS ER/CT (+), HER2 (-), (0/8) LN, negative margins s/p chemo + RT. Patient had MRI 1/25/21 and no additional areas of concern were identified. Patient's genetic testing was negative and Prelude test revealed no additional benefit from radiation therapy. \par \par Patient met with Dr. Patel and after careful consideration decided to abstain from radiation therapy. \par \par Patient has been on Tamoxifen for 3 years managed by Dr. Meek ([facility]) and is complaining of hot flashes but otherwise tolerating well.

## 2022-04-17 NOTE — ASSESSMENT
[FreeTextEntry1] : 62 yo F presents for follow up with history of 0.7cm DCIS, intermediate nuclear grade, solid and cribriform types, assoc. w/ calcs with one minute 0.15cm focus of DCIS present in additional lateral margin and 0.5cm away from final lateral margin s/p L lumpectomy on 2/26/21. Patient has hx of L Lumpx & ALND w/ Dr. Juliano Norris 1997 for IDC and DCIS ER/[place] (+), HER2 (-), (0/8) LN, negative margins s/p chemo + RT. Patient had MRI 1/25/21 and no additional areas of concern were identified. Genetic testing negative, no indication for XRT at this time, currently on Tamoxifen. Patient is without complaint, physical exam WNL. Patient will have bilateral diagnostic mammogram with US in Dec 2022 and RTC for reexamination. She will also have B/L MRI in July 2023 as she is at a high risk for recurrence. Patient verbalized understanding and agreement of plan.\par

## 2022-04-17 NOTE — DATA REVIEWED
[FreeTextEntry1] : [birth year]: L Lumpx & ALND: 3.2cm IDC and DCIS, (0/8) LN, ER+ (81%), DE+ (43%), HER2 (-), negative margins\par 12/17/19 [facility] Screening MG - Increased skin thickening in lower inner L breast. BIRADS 0. Recommend MRI. \par 12/27/19 Nicolas MRI - Nodular enhancement at L inner portion of thickened skin surface. Recommend biopsy. BIRADS 4. \par 1/13/20 L 9:00 US Core Bx - fibrocollagenous scar w/ chronic inflammation. No evidence of neoplasm. Surgery consult necessary for clinical correlation and to evaluate L axillary lymph node with mildly thickened cortex that's normal in size. \par 1/22/20: L Punch Biopsy Lower Inner Quadrant: follicular cyst, infundibular type\par 12/22/20: Nicolas MG & US: scattered areas of fibroglandular density, L - new regional coarse calcs some w/ linear configuration periareolar region UOQ 3cm deep to nipple, benign axillary LN w/ fatty hilum, vascular calcs, post-op changes and calcifying oil cyst/fat necrosis UOQ, persistent skin thickening inner lower aspect stable compared to 1/2020, round rim and dystrophic calcs, US - L - stable skin thickening 9:00 2FN, 1.3cm abnormal LN slightly increased in size. BIRADS 4. L Stereo bx rec. \par 1/14/21: L Stereo bx and L Axillary Bx: L Stereo bx Upper Outer Periareolar - "barbell" marker - DCIS ER+ (95%), DE+ (80%) w/ residual adjacent calcs; L Axillary LN - reactive LN w/ sinus histiocytosis no evidence of metastatic carcinoma - concordant. \par \par 1/25/21: MRI: scattered fibroglandular tissue, minimal background parenchymal enhancement. L - periareolar region UOQ 1:00 and metallic marker is present w/ assoc. 0.7cm partial rim enhancement and adjacent associated hermatoma 3.2cm deep to nipple, 1.5cm from lateral skin surface at site of known DCIS, 0.8cm axillary LN assoc. w/ tissue marker w/ adjacent patchy enhancement c/w post-bx change, post-op changes UOQ, inner aspect of L breast, skin thickening and skin enhancement present., findings c/w known DCIS and assoc. bx change 1:00 periareolar region. BIRADS 6.\par \par 2/26/21: L Lumpx: DCIS, intermeidate nuclear grade, solid and cribriform types, assoc. w/ calcs with one minute 0.15cm focus of DCIS in additional lateral margin and 0.5cm away from final lateral margin\par \par 12/23/21 B/L Diagnostic MMG/US (Outside Imaging): Benign appearing calcifications, postoperative and postsurgical changes, no evidence of mammographic malignancy. New postoperative architectural distortion is present at 12-1:00 region in the left breast with postoperative skin retraction in this region. Hypoechoic scarring and fat necrosis on ultrasound, see full report for details. BIRADS 2: Benign Findings. Follow up with annual imaging. \par

## 2022-04-17 NOTE — REVIEW OF SYSTEMS
[As Noted in HPI] : as noted in HPI [Negative] : Constitutional [Shortness Of Breath] : no shortness of breath [Skin Lesions] : no skin lesions [Skin Wound] : no skin wound

## 2022-04-17 NOTE — PHYSICAL EXAM
[Examined in the supine and seated position] : examined in the supine and seated position [No dominant masses] : no dominant masses in right breast  [No Nipple Retraction] : no right nipple retraction [No Nipple Discharge] : no right nipple discharge [Breast Mass Right Breast ___cm] : no masses [Breast Mass Left Breast ___cm] : no masses [No Axillary Lymphadenopathy] : no left axillary lymphadenopathy [No Supraclavicular Adenopathy] : no supraclavicular adenopathy [de-identified] : smaller than R breast with post-radiation and post-lumpx changes, lower inner quadrant skin thickening noted\par \par well-healing lumpectomy site with no erythema, dehiscence, or drainage

## 2022-07-14 ENCOUNTER — APPOINTMENT (OUTPATIENT)
Dept: MRI IMAGING | Facility: HOSPITAL | Age: 62
End: 2022-07-14

## 2022-07-18 ENCOUNTER — RESULT REVIEW (OUTPATIENT)
Age: 62
End: 2022-07-18

## 2022-07-18 ENCOUNTER — OUTPATIENT (OUTPATIENT)
Dept: OUTPATIENT SERVICES | Facility: HOSPITAL | Age: 62
LOS: 1 days | End: 2022-07-18
Payer: COMMERCIAL

## 2022-07-18 PROCEDURE — 77049 MRI BREAST C-+ W/CAD BI: CPT | Mod: 26

## 2022-07-21 ENCOUNTER — APPOINTMENT (OUTPATIENT)
Dept: MRI IMAGING | Facility: HOSPITAL | Age: 62
End: 2022-07-21

## 2022-07-25 PROCEDURE — C8937: CPT

## 2022-07-25 PROCEDURE — C8908: CPT

## 2022-07-25 PROCEDURE — A9585: CPT

## 2022-07-25 PROCEDURE — 77049 MRI BREAST C-+ W/CAD BI: CPT | Mod: 26

## 2022-07-29 ENCOUNTER — NON-APPOINTMENT (OUTPATIENT)
Age: 62
End: 2022-07-29

## 2022-08-01 DIAGNOSIS — R92.8 OTHER ABNORMAL AND INCONCLUSIVE FINDINGS ON DIAGNOSTIC IMAGING OF BREAST: ICD-10-CM

## 2022-12-28 ENCOUNTER — APPOINTMENT (OUTPATIENT)
Dept: BREAST CENTER | Facility: CLINIC | Age: 62
End: 2022-12-28

## 2023-01-10 ENCOUNTER — APPOINTMENT (OUTPATIENT)
Dept: MAMMOGRAPHY | Facility: HOSPITAL | Age: 63
End: 2023-01-10
Payer: COMMERCIAL

## 2023-01-10 ENCOUNTER — APPOINTMENT (OUTPATIENT)
Dept: MRI IMAGING | Facility: HOSPITAL | Age: 63
End: 2023-01-10
Payer: COMMERCIAL

## 2023-01-10 ENCOUNTER — APPOINTMENT (OUTPATIENT)
Dept: ULTRASOUND IMAGING | Facility: HOSPITAL | Age: 63
End: 2023-01-10
Payer: COMMERCIAL

## 2023-01-24 ENCOUNTER — APPOINTMENT (OUTPATIENT)
Dept: BREAST CENTER | Facility: CLINIC | Age: 63
End: 2023-01-24

## 2023-01-26 ENCOUNTER — APPOINTMENT (OUTPATIENT)
Dept: ULTRASOUND IMAGING | Facility: HOSPITAL | Age: 63
End: 2023-01-26

## 2023-01-26 ENCOUNTER — APPOINTMENT (OUTPATIENT)
Dept: MAMMOGRAPHY | Facility: HOSPITAL | Age: 63
End: 2023-01-26

## 2023-02-23 ENCOUNTER — OUTPATIENT (OUTPATIENT)
Dept: OUTPATIENT SERVICES | Facility: HOSPITAL | Age: 63
LOS: 1 days | End: 2023-02-23
Payer: COMMERCIAL

## 2023-02-23 ENCOUNTER — APPOINTMENT (OUTPATIENT)
Dept: MRI IMAGING | Facility: HOSPITAL | Age: 63
End: 2023-02-23

## 2023-02-23 PROCEDURE — 77049 MRI BREAST C-+ W/CAD BI: CPT | Mod: 26

## 2023-02-23 PROCEDURE — C8937: CPT

## 2023-02-23 PROCEDURE — C8908: CPT

## 2023-03-20 PROBLEM — Z98.890 S/P BREAST LUMPECTOMY: Status: ACTIVE | Noted: 2021-02-26

## 2023-03-22 ENCOUNTER — APPOINTMENT (OUTPATIENT)
Dept: BREAST CENTER | Facility: CLINIC | Age: 63
End: 2023-03-22
Payer: MEDICARE

## 2023-03-22 DIAGNOSIS — Z98.890 OTHER SPECIFIED POSTPROCEDURAL STATES: ICD-10-CM

## 2023-06-13 ENCOUNTER — APPOINTMENT (OUTPATIENT)
Dept: BREAST CENTER | Facility: CLINIC | Age: 63
End: 2023-06-13
Payer: MEDICARE

## 2023-06-13 VITALS
DIASTOLIC BLOOD PRESSURE: 81 MMHG | HEIGHT: 68 IN | WEIGHT: 224 LBS | BODY MASS INDEX: 33.95 KG/M2 | HEART RATE: 81 BPM | SYSTOLIC BLOOD PRESSURE: 124 MMHG

## 2023-06-13 DIAGNOSIS — D05.12 INTRADUCTAL CARCINOMA IN SITU OF LEFT BREAST: ICD-10-CM

## 2023-06-13 DIAGNOSIS — Z00.00 ENCOUNTER FOR GENERAL ADULT MEDICAL EXAMINATION W/OUT ABNORMAL FINDINGS: ICD-10-CM

## 2023-06-13 PROCEDURE — 99213 OFFICE O/P EST LOW 20 MIN: CPT

## 2023-06-15 NOTE — DATA REVIEWED
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 BIRADS 0. Recommend MRI. \par 12/27/19 Nicolas MRI - Nodular enhancement at L inner portion of thickened skin surface. Recommend biopsy. BIRADS 4. \par 1/13/20 L 9:00 US Core Bx - fibrocollagenous scar w/ chronic inflammation. No evidence of neoplasm. Surgery consult necessary for clinical correlation and to evaluate L axillary lymph node with mildly thickened cortex that's normal in size. \par 1/22/20: L Punch Biopsy Lower Inner Quadrant: follicular cyst, infundibular type\par 12/22/20: Nicolas MG & US: scattered areas of fibroglandular density, L - new regional coarse calcs some w/ linear configuration periareolar region UOQ 3cm deep to nipple, benign axillary LN w/ fatty hilum, vascular calcs, post-op changes and calcifying oil cyst/fat necrosis UOQ, persistent skin thickening inner lower aspect stable compared to 1/2020, round rim and dystrophic calcs, US - L - stable skin thickening 9:00 2FN, 1.3cm abnormal LN slightly increased in size. BIRADS 4. L Stereo bx rec. \par 1/14/21: L Stereo bx and L Axillary Bx: L Stereo bx Upper Outer Periareolar - "barbell" marker - DCIS ER+ (95%), HI+ (80%) w/ residual adjacent calcs; L Axillary LN - reactive LN w/ sinus histiocytosis no evidence of metastatic carcinoma - concordant. \par \par 1/25/21: MRI: scattered fibroglandular tissue, minimal background parenchymal enhancement. L - periareolar region UOQ 1:00 and metallic marker is present w/ assoc. 0.7cm partial rim enhancement and adjacent associated hermatoma 3.2cm deep to nipple, 1.5cm from lateral skin surface at site of known DCIS, 0.8cm axillary LN assoc. w/ tissue marker w/ adjacent patchy enhancement c/w post-bx change, post-op changes UOQ, inner aspect of L breast, skin thickening and skin enhancement present., findings c/w known DCIS and assoc. bx change 1:00 periareolar region. BIRADS 6.\par \par 2/26/21: L Lumpx: DCIS, intermeidate nuclear grade, solid and cribriform types, assoc. w/ calcs with one minute 0.15cm focus of DCIS in additional lateral margin and 0.5cm away from final lateral margin\par \par 12/23/21 B/L Diagnostic MMG/US (Outside Imaging): Benign appearing calcifications, postoperative and postsurgical changes, no evidence of mammographic malignancy. New postoperative architectural distortion is present at 12-1:00 region in the left breast with postoperative skin retraction in this region. Hypoechoic scarring and fat necrosis on ultrasound, see full report for details. BIRADS 2: Benign Findings. Follow up with annual imaging. \par \par 7/18/22 ([facility]) B/L MRI: Interval post surgical changes, extending from the left nipple into the central breast.  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  No suspicious masses. RECOMMENDATION:  MRI in 6 months. BI-RADS 3-Probably Benign\par \par 12/23/22 ([facility]) B/L sMMG/US: scattered areas of fbg density. At 2:00 in the LEFT breast, in the periareolar region, hypoechoic shadowing scar with no associated abnormal color flow is stable. Postradiation skin thickening on the left is stable. At 2:00 in the LEFT breast 9cmfn hypoechoic shadowing scar with no associated abnormal color flow is stable. At 9:00 in the LEFT breast 2cmfn at the site of prior biopsy, there are post biopsy changes with no suspicious US abnormality. The previously biopsied LEFT axillary lymph node is not significantly changed measuring 11 x 11 x 7mm with internal echogenic marker and no associated abnormal color flow. In the LEFT axilla, hypoechoic shadowing scar with no associated abnormal color flow is again present. There are no new enlarged or abnormal appearing left axillary lymph nodes. No mammographic or US evidence of malignancy. A follow-up mammograpm is recommended in one year. BIRADS-2: Benign \par \par 2/23/23 ([facility]) B/L MRI:  Post treatment changes of left breast. No MRI evidence of malignancy. RECOMMENDATION:  Resume Annual Mammography on Schedule. BI-RADS: Benign\par

## 2023-06-15 NOTE — REVIEW OF SYSTEMS
[Shortness Of Breath] : no shortness of breath [Skin Lesions] : no skin lesions [Skin Wound] : no skin wound

## 2023-06-15 NOTE — HISTORY OF PRESENT ILLNESS
[FreeTextEntry1] : 64 yo F presents for follow up with history of 0.7cm DCIS, intermediate nuclear grade, solid and cribriform types, assoc. w/ calcs with one minute 0.15cm focus of DCIS present in additional lateral margin and 0.5cm away from final lateral margin s/p L lumpectomy on 2/26/21.\par \par Patient has hx of L Lumpx & ALND w/ Dr. Juliano Norris 1997 for IDC and DCIS ER/[place] (+), HER2 (-), (0/8) LN, negative margins s/p chemo + RT. Patient had MRI 1/25/21 and no additional areas of concern were identified. Patient's genetic testing was negative and Prelude test revealed no additional benefit from radiation therapy. \par \par Patient met with Dr. Patel and after careful consideration decided to abstain from radiation therapy. \par \par Patient has been on Tamoxifen for 3 years managed by Dr. Meek ([facility]) and is complaining of hot flashes but otherwise tolerating well.\par \par B/L MRI 7/18/22  BIRADS-3 revealed interval fat necrosis and areas of nonmass enhancement in the left breast, likely reflecting maturing fat necrosis. Subsequent 6 month follow up MRI performed 2/23/23, BIRADS-2. \par \par B/L sMMG/US 12/23/22 BIRADS-2.\par

## 2023-06-15 NOTE — ASSESSMENT
[FreeTextEntry1] : 62 yo F presents for follow up with history of 0.7cm DCIS, intermediate nuclear grade, solid and cribriform types, assoc. w/ calcs with one minute 0.15cm focus of DCIS present in additional lateral margin and 0.5cm away from final lateral margin s/p L lumpectomy on 2/26/21. Patient has hx of L Lumpx & ALND w/ Dr. Juliano Norris 1997 for IDC and DCIS ER/[place] (+), HER2 (-), (0/8) LN, negative margins s/p chemo + RT. Patient had MRI 1/25/21 and no additional areas of concern were identified. Genetic testing negative, no indication for XRT at this time, currently on Tamoxifen. Patient is without complaint, physical exam WNL.\par \par Reviewed most recent breast imaging: B/L sMMG/US 12/23/22 BIRADS-2. 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 Subsequent 6 month follow up MRI performed 2/23/23, BIRADS-2. \par \par Plan for B/L DX MMG/US and re-examination in Dec 2023. Patient verbalizes understanding and is in agreement with the plan.\par \par \par

## 2023-06-15 NOTE — PHYSICAL EXAM
[de-identified] : smaller than R breast with post-radiation and post-lumpx changes, lower inner quadrant skin thickening noted\par \par well-healing lumpectomy site with no erythema, dehiscence, or drainage

## 2023-07-31 ENCOUNTER — NON-APPOINTMENT (OUTPATIENT)
Age: 63
End: 2023-07-31

## 2023-08-01 ENCOUNTER — NON-APPOINTMENT (OUTPATIENT)
Age: 63
End: 2023-08-01

## 2023-08-02 ENCOUNTER — APPOINTMENT (OUTPATIENT)
Dept: BREAST CENTER | Facility: CLINIC | Age: 63
End: 2023-08-02

## 2023-10-01 PROBLEM — Z92.3 HISTORY OF RADIATION THERAPY: Status: RESOLVED | Noted: 2021-04-07 | Resolved: 2023-10-01

## 2023-12-13 ENCOUNTER — NON-APPOINTMENT (OUTPATIENT)
Age: 63
End: 2023-12-13

## 2023-12-19 ENCOUNTER — APPOINTMENT (OUTPATIENT)
Dept: BREAST CENTER | Facility: CLINIC | Age: 63
End: 2023-12-19

## 2023-12-25 ENCOUNTER — RESULT REVIEW (OUTPATIENT)
Age: 63
End: 2023-12-25

## 2023-12-28 ENCOUNTER — NON-APPOINTMENT (OUTPATIENT)
Age: 63
End: 2023-12-28

## 2024-01-16 ENCOUNTER — NON-APPOINTMENT (OUTPATIENT)
Age: 64
End: 2024-01-16

## 2024-01-16 ENCOUNTER — APPOINTMENT (OUTPATIENT)
Dept: BREAST CENTER | Facility: CLINIC | Age: 64
End: 2024-01-16

## 2024-02-07 ENCOUNTER — APPOINTMENT (OUTPATIENT)
Dept: BREAST CENTER | Facility: CLINIC | Age: 64
End: 2024-02-07

## 2024-03-05 ENCOUNTER — APPOINTMENT (OUTPATIENT)
Dept: BREAST CENTER | Facility: CLINIC | Age: 64
End: 2024-03-05
Payer: MEDICARE

## 2024-03-05 VITALS
BODY MASS INDEX: 32.58 KG/M2 | HEIGHT: 68 IN | SYSTOLIC BLOOD PRESSURE: 127 MMHG | DIASTOLIC BLOOD PRESSURE: 82 MMHG | HEART RATE: 71 BPM | WEIGHT: 215 LBS

## 2024-03-05 DIAGNOSIS — Z80.3 FAMILY HISTORY OF MALIGNANT NEOPLASM OF BREAST: ICD-10-CM

## 2024-03-05 DIAGNOSIS — Z85.3 PERSONAL HISTORY OF MALIGNANT NEOPLASM OF BREAST: ICD-10-CM

## 2024-03-05 DIAGNOSIS — Z08 ENCOUNTER FOR FOLLOW-UP EXAMINATION AFTER COMPLETED TREATMENT FOR MALIGNANT NEOPLASM: ICD-10-CM

## 2024-03-05 DIAGNOSIS — Z85.3 ENCOUNTER FOR FOLLOW-UP EXAMINATION AFTER COMPLETED TREATMENT FOR MALIGNANT NEOPLASM: ICD-10-CM

## 2024-03-05 PROCEDURE — 99213 OFFICE O/P EST LOW 20 MIN: CPT

## 2024-03-05 RX ORDER — TIRZEPATIDE 15 MG/.5ML
15 INJECTION, SOLUTION SUBCUTANEOUS
Refills: 0 | Status: ACTIVE | COMMUNITY

## 2024-03-06 NOTE — DATA REVIEWED
[FreeTextEntry1] : [birth year]: L Lumpx & ALND: 3.2cm IDC and DCIS, (0/8) LN, ER+ (81%), [place]+ (43%), HER2 (-), negative margins 12/17/19 [facility] Screening MG - Increased skin thickening in lower inner L breast. BIRADS 0. Recommend MRI.  12/27/19 Nicolas MRI - Nodular enhancement at L inner portion of thickened skin surface. Recommend biopsy. BIRADS 4.  1/13/20 L 9:00 US Core Bx - fibrocollagenous scar w/ chronic inflammation. No evidence of neoplasm. Surgery consult necessary for clinical correlation and to evaluate L axillary lymph node with mildly thickened cortex that's normal in size.  1/22/20: L Punch Biopsy Lower Inner Quadrant: follicular cyst, infundibular type 12/22/20: Nicolas MG & US: scattered areas of fibroglandular density, L - new regional coarse calcs some w/ linear configuration periareolar region UOQ 3cm deep to nipple, benign axillary LN w/ fatty hilum, vascular calcs, post-op changes and calcifying oil cyst/fat necrosis UOQ, persistent skin thickening inner lower aspect stable compared to 1/2020, round rim and dystrophic calcs, US - L - stable skin thickening 9:00 2FN, 1.3cm abnormal LN slightly increased in size. BIRADS 4. L Stereo bx rec.  1/14/21: L Stereo bx and L Axillary Bx: L Stereo bx Upper Outer Periareolar - "barbell" marker - DCIS ER+ (95%), [place]+ (80%) w/ residual adjacent calcs; L Axillary LN - reactive LN w/ sinus histiocytosis no evidence of metastatic carcinoma - concordant.   1/25/21: MRI: scattered fibroglandular tissue, minimal background parenchymal enhancement. L - periareolar region UOQ 1:00 and metallic marker is present w/ assoc. 0.7cm partial rim enhancement and adjacent associated hermatoma 3.2cm deep to nipple, 1.5cm from lateral skin surface at site of known DCIS, 0.8cm axillary LN assoc. w/ tissue marker w/ adjacent patchy enhancement c/w post-bx change, post-op changes UOQ, inner aspect of L breast, skin thickening and skin enhancement present., findings c/w known DCIS and assoc. bx change 1:00 periareolar region. BIRADS 6.  2/26/21: L Lumpx: DCIS, intermeidate nuclear grade, solid and cribriform types, assoc. w/ calcs with one minute 0.15cm focus of DCIS in additional lateral margin and 0.5cm away from final lateral margin  12/23/21 B/L Diagnostic MMG/US (Outside Imaging): Benign appearing calcifications, postoperative and postsurgical changes, no evidence of mammographic malignancy. New postoperative architectural distortion is present at 12-1:00 region in the left breast with postoperative skin retraction in this region. Hypoechoic scarring and fat necrosis on ultrasound, see full report for details. BIRADS 2: Benign Findings. Follow up with annual imaging.   7/18/22 ([facility]) B/L MRI: Interval post surgical changes, extending from the left nipple into the central breast.  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  No suspicious masses. RECOMMENDATION:  MRI in 6 months. BI-RADS 3-Probably Benign  12/23/22 ([facility]) B/L sMMG/US: scattered areas of fbg density. At 2:00 in the LEFT breast, in the periareolar region, hypoechoic shadowing scar with no associated abnormal color flow is stable. Postradiation skin thickening on the left is stable. At 2:00 in the LEFT breast 9cmfn hypoechoic shadowing scar with no associated abnormal color flow is stable. At 9:00 in the LEFT breast 2cmfn at the site of prior biopsy, there are post biopsy changes with no suspicious US abnormality. The previously biopsied LEFT axillary lymph node is not significantly changed measuring 11 x 11 x 7mm with internal echogenic marker and no associated abnormal color flow. In the LEFT axilla, hypoechoic shadowing scar with no associated abnormal color flow is again present. There are no new enlarged or abnormal appearing left axillary lymph nodes. No mammographic or US evidence of malignancy. A follow-up mammograpm is recommended in one year. BIRADS-2: Benign   2/23/23 ([facility]) B/L MRI:  Post treatment changes of left breast. No MRI evidence of malignancy. RECOMMENDATION:  Resume Annual Mammography on Schedule. BI-RADS: Benign  12/26/23 (outside) B/L DX MMG/US: scattered areas of fibroglandular density. R anterior central asymmetry 3.5 cm deep is stable. Round, coarse, rim and skin benign-appearing calcifications, some with further coarsening. Postop distortion and calcifying and noncalcifying fat necrosis is again present at L UOQ and L 12:00. L 2:00 periareolar region, hypochoic shadowing scar with no associated abnormal color flow is stable. L 2:00 9 cmfn, hypochoic scar is stable. Previously biopsied L axillary lymph node is stable at 12 mm. BI-RADS 2, benign.  FOLLOW UP: Mammography in 1 year. Patient is due for her annual screening MRI in Feb 2024.

## 2024-03-06 NOTE — HISTORY OF PRESENT ILLNESS
SUPERIOR, NO ME, THERE IS CAPILLARY DROP OUT, FOLLOW FOR DEV OF NVE. [FreeTextEntry1] : 62 yo F presents with  for follow up with history of 0.7cm DCIS, intermediate nuclear grade, solid and cribriform types, assoc. w/ calcs with one minute 0.15cm focus of DCIS present in additional lateral margin and 0.5cm away from final lateral margin s/p L lumpectomy on 2/26/21.  Patient has hx of L Lumpx & ALND w/ Dr. Juliano Norris 1997 for IDC and DCIS ER/ID (+), HER2 (-), (0/8) LN, negative margins s/p chemo + RT. Patient had MRI 1/25/21 and no additional areas of concern were identified. Patient's genetic testing was negative and Prelude test revealed no additional benefit from radiation therapy.   Patient met with Dr. Patel and after careful consideration decided to abstain from radiation therapy. Patient is on Tamoxifen, originally managed by Dr. Meek ([facility]), now managed by Dr. Mosley.   B/L MRI 7/18/22 BIRADS-3 revealed interval fat necrosis and areas of nonmass enhancement in the left breast, likely reflecting maturing fat necrosis. Subsequent 6 month follow up MRI performed 2/23/23, BIRADS-2.  Most recent imaging: B/L DX MMG/US12/26/23 BIRADS-2

## 2024-03-06 NOTE — REVIEW OF SYSTEMS
[Shortness Of Breath] : no shortness of breath [Skin Wound] : no skin wound [Skin Lesions] : no skin lesions

## 2024-03-06 NOTE — PHYSICAL EXAM
[de-identified] : smaller than R breast with post-radiation and post-lumpx changes, lower inner quadrant skin thickening noted\par  \par  well-healing lumpectomy site with no erythema, dehiscence, or drainage

## 2024-03-06 NOTE — ASSESSMENT
[FreeTextEntry1] : 62 yo F presents for follow up with history of 0.7cm DCIS, intermediate nuclear grade, solid and cribriform types, assoc. w/ calcs with one minute 0.15cm focus of DCIS present in additional lateral margin and 0.5cm away from final lateral margin s/p L lumpectomy on 2/26/21. Patient has hx of L Lumpx & ALND w/ Dr. Juliano Norris 1997 for IDC and DCIS ER/[place] (+), HER2 (-), (0/8) LN, negative margins s/p chemo + RT. Patient had MRI 1/25/21 and no additional areas of concern were identified. Genetic testing negative, no indication for XRT at this time, currently on Tamoxifen. Patient is without complaint, physical exam WNL.  B/L sMMG/US (12/23/22) BIRADS-2. B/L MRI (7/18/22) BIRADS-3 revealed interval fat necrosis and areas of nonmass enhancement in the left breast, likely reflecting maturing fat necrosis. Subsequent 6 month follow up MRI performed 2/23/23, BIRADS-2. Patients last annual mammo/sono was a B/L Dx MMG/US on 12/26/23, BIRADS 2.   Discussed patients imaging in detail and answered all patient's questions. Patient without complaints. Physical exam WNL. Most recent imaging reviewed, as above. Discussed with patient about B/L MRI, discussed with patient plan to skip MRI for one year. Plan for B/L DX MMG/US and re-examination in December 2024. Patient verbalizes understanding and is in agreement with the plan.

## 2024-05-23 ENCOUNTER — NON-APPOINTMENT (OUTPATIENT)
Age: 64
End: 2024-05-23

## 2024-10-21 ENCOUNTER — NON-APPOINTMENT (OUTPATIENT)
Age: 64
End: 2024-10-21

## 2025-02-26 ENCOUNTER — RESULT REVIEW (OUTPATIENT)
Age: 65
End: 2025-02-26

## 2025-03-19 ENCOUNTER — APPOINTMENT (OUTPATIENT)
Dept: BREAST CENTER | Facility: CLINIC | Age: 65
End: 2025-03-19

## 2025-03-19 ENCOUNTER — NON-APPOINTMENT (OUTPATIENT)
Age: 65
End: 2025-03-19

## 2025-03-19 VITALS — WEIGHT: 198 LBS | BODY MASS INDEX: 30.01 KG/M2 | HEIGHT: 68 IN

## 2025-03-19 DIAGNOSIS — Z85.3 PERSONAL HISTORY OF MALIGNANT NEOPLASM OF BREAST: ICD-10-CM

## 2025-03-19 DIAGNOSIS — Z85.3 ENCOUNTER FOR FOLLOW-UP EXAMINATION AFTER COMPLETED TREATMENT FOR MALIGNANT NEOPLASM: ICD-10-CM

## 2025-03-19 DIAGNOSIS — Z80.3 FAMILY HISTORY OF MALIGNANT NEOPLASM OF BREAST: ICD-10-CM

## 2025-03-19 DIAGNOSIS — D05.12 INTRADUCTAL CARCINOMA IN SITU OF LEFT BREAST: ICD-10-CM

## 2025-03-19 DIAGNOSIS — Z08 ENCOUNTER FOR FOLLOW-UP EXAMINATION AFTER COMPLETED TREATMENT FOR MALIGNANT NEOPLASM: ICD-10-CM

## 2025-03-19 PROCEDURE — 99213 OFFICE O/P EST LOW 20 MIN: CPT

## 2025-03-19 RX ORDER — LATANOPROST/PF 0.005 %
0.01 DROPS OPHTHALMIC (EYE)
Refills: 0 | Status: ACTIVE | COMMUNITY

## 2025-08-26 ENCOUNTER — RESULT REVIEW (OUTPATIENT)
Age: 65
End: 2025-08-26

## 2025-08-28 ENCOUNTER — NON-APPOINTMENT (OUTPATIENT)
Age: 65
End: 2025-08-28